# Patient Record
Sex: MALE | Race: WHITE | NOT HISPANIC OR LATINO | RURAL
[De-identification: names, ages, dates, MRNs, and addresses within clinical notes are randomized per-mention and may not be internally consistent; named-entity substitution may affect disease eponyms.]

---

## 2017-10-24 ENCOUNTER — HISTORICAL (OUTPATIENT)
Dept: ADMINISTRATIVE | Facility: HOSPITAL | Age: 77
End: 2017-10-24

## 2017-10-26 LAB
LAB AP CLINICAL INFORMATION: NORMAL
LAB AP DIAGNOSIS - HISTORICAL: NORMAL
LAB AP GROSS PATHOLOGY - HISTORICAL: NORMAL
LAB AP SPECIMEN SUBMITTED - HISTORICAL: NORMAL

## 2018-10-24 ENCOUNTER — HISTORICAL (OUTPATIENT)
Dept: ADMINISTRATIVE | Facility: HOSPITAL | Age: 78
End: 2018-10-24

## 2019-01-16 ENCOUNTER — HISTORICAL (OUTPATIENT)
Dept: ADMINISTRATIVE | Facility: HOSPITAL | Age: 79
End: 2019-01-16

## 2019-07-16 ENCOUNTER — HISTORICAL (OUTPATIENT)
Dept: ADMINISTRATIVE | Facility: HOSPITAL | Age: 79
End: 2019-07-16

## 2019-07-18 LAB
LAB AP CLINICAL INFORMATION: NORMAL
LAB AP COMMENTS: NORMAL
LAB AP DIAGNOSIS - HISTORICAL: NORMAL
LAB AP GROSS PATHOLOGY - HISTORICAL: NORMAL
LAB AP SPECIMEN SUBMITTED - HISTORICAL: NORMAL

## 2020-02-06 ENCOUNTER — HISTORICAL (OUTPATIENT)
Dept: ADMINISTRATIVE | Facility: HOSPITAL | Age: 80
End: 2020-02-06

## 2020-02-27 ENCOUNTER — HISTORICAL (OUTPATIENT)
Dept: ADMINISTRATIVE | Facility: HOSPITAL | Age: 80
End: 2020-02-27

## 2020-08-19 ENCOUNTER — HISTORICAL (OUTPATIENT)
Dept: ADMINISTRATIVE | Facility: HOSPITAL | Age: 80
End: 2020-08-19

## 2020-08-21 LAB

## 2022-08-17 PROCEDURE — 88305 TISSUE EXAM BY PATHOLOGIST: CPT | Mod: SUR

## 2022-08-17 PROCEDURE — 88305 PATHOLOGY, DERMATOLOGY: ICD-10-PCS | Mod: 26,,, | Performed by: PATHOLOGY

## 2022-08-17 PROCEDURE — 88305 TISSUE EXAM BY PATHOLOGIST: CPT | Mod: 26,,, | Performed by: PATHOLOGY

## 2022-08-18 ENCOUNTER — LAB REQUISITION (OUTPATIENT)
Dept: LAB | Facility: HOSPITAL | Age: 82
End: 2022-08-18
Payer: MEDICARE

## 2022-08-18 DIAGNOSIS — D49.2 NEOPLASM OF UNSPECIFIED BEHAVIOR OF BONE, SOFT TISSUE, AND SKIN: ICD-10-CM

## 2022-08-19 LAB
DHEA SERPL-MCNC: NORMAL
ESTROGEN SERPL-MCNC: NORMAL PG/ML
INSULIN SERPL-ACNC: NORMAL U[IU]/ML
LAB AP GROSS DESCRIPTION: NORMAL
LAB AP LABORATORY NOTES: NORMAL
LAB AP SPEC A DDX: NORMAL
LAB AP SPEC A MORPHOLOGY: NORMAL
LAB AP SPEC A PROCEDURE: NORMAL
T3RU NFR SERPL: NORMAL %

## 2022-09-08 PROCEDURE — 88305 TISSUE EXAM BY PATHOLOGIST: CPT | Mod: SUR | Performed by: DERMATOLOGY

## 2022-09-08 PROCEDURE — 88305 TISSUE EXAM BY PATHOLOGIST: CPT | Mod: 26,,, | Performed by: PATHOLOGY

## 2022-09-08 PROCEDURE — 88305 PATHOLOGY, DERMATOLOGY: ICD-10-PCS | Mod: 26,,, | Performed by: PATHOLOGY

## 2022-09-09 ENCOUNTER — LAB REQUISITION (OUTPATIENT)
Dept: LAB | Facility: HOSPITAL | Age: 82
End: 2022-09-09
Payer: MEDICARE

## 2022-09-09 DIAGNOSIS — L29.8 OTHER PRURITUS: ICD-10-CM

## 2022-09-09 DIAGNOSIS — D49.2 NEOPLASM OF UNSPECIFIED BEHAVIOR OF BONE, SOFT TISSUE, AND SKIN: ICD-10-CM

## 2022-09-09 DIAGNOSIS — L53.8 OTHER SPECIFIED ERYTHEMATOUS CONDITIONS: ICD-10-CM

## 2022-09-12 LAB
ESTROGEN SERPL-MCNC: NORMAL PG/ML
INSULIN SERPL-ACNC: NORMAL U[IU]/ML
LAB AP GROSS DESCRIPTION: NORMAL
LAB AP LABORATORY NOTES: NORMAL
LAB AP SPEC A DDX: NORMAL
LAB AP SPEC A MORPHOLOGY: NORMAL
LAB AP SPEC A PROCEDURE: NORMAL
T3RU NFR SERPL: NORMAL %

## 2025-05-15 ENCOUNTER — HOSPITAL ENCOUNTER (OUTPATIENT)
Dept: RADIOLOGY | Facility: HOSPITAL | Age: 85
Discharge: HOME | End: 2025-05-15
Attending: FAMILY MEDICINE
Payer: MEDICARE

## 2025-05-15 DIAGNOSIS — K82.9 DISEASE OF GALLBLADDER: ICD-10-CM

## 2025-05-15 PROCEDURE — 25500020 PHARM REV CODE 255

## 2025-05-15 PROCEDURE — 74178 CT ABD&PLV WO CNTR FLWD CNTR: CPT | Mod: TC

## 2025-05-15 RX ORDER — IOPAMIDOL 755 MG/ML
100 INJECTION, SOLUTION INTRAVASCULAR
Status: COMPLETED | OUTPATIENT
Start: 2025-05-15 | End: 2025-05-15

## 2025-05-15 RX ADMIN — IOPAMIDOL 85 ML: 755 INJECTION, SOLUTION INTRAVENOUS at 09:05

## 2025-05-16 DIAGNOSIS — K80.20 GALL STONE: Primary | ICD-10-CM

## 2025-05-20 ENCOUNTER — HOSPITAL ENCOUNTER (EMERGENCY)
Facility: HOSPITAL | Age: 85
Discharge: ANOTHER HOSPITAL | End: 2025-05-20
Attending: FAMILY MEDICINE
Payer: MEDICARE

## 2025-05-20 ENCOUNTER — OFFICE VISIT (OUTPATIENT)
Dept: GASTROENTEROLOGY | Facility: CLINIC | Age: 85
End: 2025-05-20
Payer: MEDICARE

## 2025-05-20 VITALS
WEIGHT: 192 LBS | DIASTOLIC BLOOD PRESSURE: 85 MMHG | TEMPERATURE: 98 F | BODY MASS INDEX: 27.49 KG/M2 | HEIGHT: 70 IN | OXYGEN SATURATION: 100 % | HEART RATE: 89 BPM | SYSTOLIC BLOOD PRESSURE: 159 MMHG | RESPIRATION RATE: 16 BRPM

## 2025-05-20 VITALS
DIASTOLIC BLOOD PRESSURE: 56 MMHG | BODY MASS INDEX: 27.49 KG/M2 | HEART RATE: 72 BPM | HEIGHT: 70 IN | OXYGEN SATURATION: 98 % | WEIGHT: 192 LBS | SYSTOLIC BLOOD PRESSURE: 125 MMHG

## 2025-05-20 DIAGNOSIS — K80.50 GALL STONES, COMMON BILE DUCT: Primary | ICD-10-CM

## 2025-05-20 DIAGNOSIS — K83.8 COMMON BILE DUCT DILATION: ICD-10-CM

## 2025-05-20 DIAGNOSIS — K80.00 GALLSTONES AND INFLAMMATION OF GALLBLADDER WITHOUT OBSTRUCTION: Primary | ICD-10-CM

## 2025-05-20 DIAGNOSIS — R93.5 ABNORMAL CT OF THE ABDOMEN: ICD-10-CM

## 2025-05-20 DIAGNOSIS — K80.20 CALCULUS OF GALLBLADDER WITHOUT CHOLECYSTITIS WITHOUT OBSTRUCTION: ICD-10-CM

## 2025-05-20 LAB
ALBUMIN SERPL BCP-MCNC: 2.7 G/DL (ref 3.4–4.8)
ALBUMIN/GLOB SERPL: 0.7 {RATIO}
ALP SERPL-CCNC: 392 U/L (ref 40–150)
ALT SERPL W P-5'-P-CCNC: 80 U/L
ANION GAP SERPL CALCULATED.3IONS-SCNC: 15 MMOL/L (ref 7–16)
APTT PPP: 35.2 SECONDS (ref 25.2–37.3)
AST SERPL W P-5'-P-CCNC: 123 U/L (ref 11–45)
BASOPHILS # BLD AUTO: 0.03 K/UL (ref 0–0.2)
BASOPHILS NFR BLD AUTO: 0.4 % (ref 0–1)
BILIRUB SERPL-MCNC: 1.9 MG/DL
BUN SERPL-MCNC: 11 MG/DL (ref 8–26)
BUN/CREAT SERPL: 15 (ref 6–20)
CALCIUM SERPL-MCNC: 9.2 MG/DL (ref 8.8–10)
CHLORIDE SERPL-SCNC: 93 MMOL/L (ref 98–107)
CO2 SERPL-SCNC: 29 MMOL/L (ref 23–31)
CREAT SERPL-MCNC: 0.75 MG/DL (ref 0.72–1.25)
DIFFERENTIAL METHOD BLD: ABNORMAL
EGFR (NO RACE VARIABLE) (RUSH/TITUS): 89 ML/MIN/1.73M2
EOSINOPHIL # BLD AUTO: 0.13 K/UL (ref 0–0.5)
EOSINOPHIL NFR BLD AUTO: 1.8 % (ref 1–4)
ERYTHROCYTE [DISTWIDTH] IN BLOOD BY AUTOMATED COUNT: 12.3 % (ref 11.5–14.5)
GLOBULIN SER-MCNC: 3.8 G/DL (ref 2–4)
GLUCOSE SERPL-MCNC: 98 MG/DL (ref 82–115)
HCT VFR BLD AUTO: 37.4 % (ref 40–54)
HGB BLD-MCNC: 12.6 G/DL (ref 13.5–18)
IMM GRANULOCYTES # BLD AUTO: 0.04 K/UL (ref 0–0.04)
IMM GRANULOCYTES NFR BLD: 0.5 % (ref 0–0.4)
INR BLD: 1.43
LYMPHOCYTES # BLD AUTO: 1.55 K/UL (ref 1–4.8)
LYMPHOCYTES NFR BLD AUTO: 21.2 % (ref 27–41)
MCH RBC QN AUTO: 34.9 PG (ref 27–31)
MCHC RBC AUTO-ENTMCNC: 33.7 G/DL (ref 32–36)
MCV RBC AUTO: 103.6 FL (ref 80–96)
MONOCYTES # BLD AUTO: 0.81 K/UL (ref 0–0.8)
MONOCYTES NFR BLD AUTO: 11.1 % (ref 2–6)
MPC BLD CALC-MCNC: 11.3 FL (ref 9.4–12.4)
NEUTROPHILS # BLD AUTO: 4.74 K/UL (ref 1.8–7.7)
NEUTROPHILS NFR BLD AUTO: 65 % (ref 53–65)
NRBC # BLD AUTO: 0 X10E3/UL
NRBC, AUTO (.00): 0 %
PLATELET # BLD AUTO: 132 K/UL (ref 150–400)
POTASSIUM SERPL-SCNC: 3.3 MMOL/L (ref 3.5–5.1)
PROT SERPL-MCNC: 6.5 G/DL (ref 5.8–7.6)
PROTHROMBIN TIME: 17.3 SECONDS (ref 11.7–14.7)
RBC # BLD AUTO: 3.61 M/UL (ref 4.6–6.2)
SODIUM SERPL-SCNC: 134 MMOL/L (ref 136–145)
WBC # BLD AUTO: 7.3 K/UL (ref 4.5–11)

## 2025-05-20 PROCEDURE — 96360 HYDRATION IV INFUSION INIT: CPT

## 2025-05-20 PROCEDURE — 99999 PR PBB SHADOW E&M-EST. PATIENT-LVL IV: CPT | Mod: PBBFAC,,,

## 2025-05-20 PROCEDURE — 36415 COLL VENOUS BLD VENIPUNCTURE: CPT | Performed by: FAMILY MEDICINE

## 2025-05-20 PROCEDURE — 99214 OFFICE O/P EST MOD 30 MIN: CPT | Mod: PBBFAC

## 2025-05-20 PROCEDURE — 85730 THROMBOPLASTIN TIME PARTIAL: CPT | Performed by: FAMILY MEDICINE

## 2025-05-20 PROCEDURE — 99285 EMERGENCY DEPT VISIT HI MDM: CPT | Mod: 25

## 2025-05-20 PROCEDURE — 80053 COMPREHEN METABOLIC PANEL: CPT | Performed by: FAMILY MEDICINE

## 2025-05-20 PROCEDURE — 25000003 PHARM REV CODE 250: Performed by: FAMILY MEDICINE

## 2025-05-20 PROCEDURE — 85025 COMPLETE CBC W/AUTO DIFF WBC: CPT | Performed by: FAMILY MEDICINE

## 2025-05-20 PROCEDURE — 99204 OFFICE O/P NEW MOD 45 MIN: CPT | Mod: S$PBB,,,

## 2025-05-20 RX ORDER — HYDROCHLOROTHIAZIDE 12.5 MG/1
12.5 TABLET ORAL DAILY
COMMUNITY

## 2025-05-20 RX ORDER — CARVEDILOL 6.25 MG/1
6.25 TABLET ORAL 2 TIMES DAILY WITH MEALS
COMMUNITY

## 2025-05-20 RX ORDER — FUROSEMIDE 20 MG/1
20 TABLET ORAL DAILY
COMMUNITY

## 2025-05-20 RX ORDER — SENNOSIDES 8.6 MG/1
1 TABLET ORAL NIGHTLY
COMMUNITY

## 2025-05-20 RX ORDER — TAMSULOSIN HYDROCHLORIDE 0.4 MG/1
0.4 CAPSULE ORAL DAILY
COMMUNITY

## 2025-05-20 RX ORDER — LANOLIN ALCOHOL/MO/W.PET/CERES
1 CREAM (GRAM) TOPICAL DAILY
COMMUNITY

## 2025-05-20 RX ADMIN — SODIUM CHLORIDE 1000 ML: 9 INJECTION, SOLUTION INTRAVENOUS at 04:05

## 2025-05-20 NOTE — ED PROVIDER NOTES
Encounter Date: 5/20/2025    SCRIBE #1 NOTE: I, Maria Elena Enriquez, am scribing for, and in the presence of,  Jose F oJhn DO. I have scribed the entire note.       History     Chief Complaint   Patient presents with    Abdominal Pain     Patient presents to the ED with c/o stone stuck in a bile duct going into the small intestine. Patient states Melisa Hoskins sent him here for further evaluation and lab work to be done.     This is an 83 y/o male,who presents to the ED for further evaluation. He states he was at Melisa Hoskins's office and sent here for further evaluation. He notes he was found to have a stone stuck in his bile duct going into the small intestines. There is no hx of a fever, chills, nausea, vomiting or diarrhea voiced today. There are no other complaints/pain the ED at this time. 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 There is no other past medical hx on file. There is no surgical hx on file. There is no smoking hx on file.     The history is provided by the patient and the spouse.     Review of patient's allergies indicates:   Allergen Reactions    Adhesive      Tears skin     History reviewed. No pertinent past medical history.  History reviewed. No pertinent surgical history.  No family history on file.  Social History[1]  Review of Systems   Constitutional:  Positive for fatigue. Negative for fever.   HENT: Negative.  Negative for sore throat.    Eyes: Negative.    Respiratory: Negative.  Negative for shortness of breath.    Cardiovascular: Negative.  Negative for chest pain.   Gastrointestinal: Negative.  Negative for nausea.   Endocrine: Negative.    Genitourinary: Negative.  Negative for dysuria.   Musculoskeletal: Negative.  Negative for back pain.   Skin: Negative.  Negative for rash.   Allergic/Immunologic: Negative.    Neurological: Negative.  Negative for weakness.   Hematological: Negative.  Does not bruise/bleed easily.   Psychiatric/Behavioral: Negative.          Physical Exam     Initial Vitals [05/20/25 1023]   BP Pulse Resp Temp SpO2   129/68 74 17 97.7 °F (36.5 °C) 97 %      MAP       --         Physical Exam    ED Course   Procedures  Labs Reviewed   COMPREHENSIVE METABOLIC PANEL - Abnormal       Result Value    Sodium 134 (*)     Potassium 3.3 (*)     Chloride 93 (*)     CO2 29      Anion Gap 15      Glucose 98      BUN 11      Creatinine 0.75      BUN/Creatinine Ratio 15      Calcium 9.2      Total Protein 6.5      Albumin 2.7 (*)     Globulin 3.8      A/G Ratio 0.7      Bilirubin, Total 1.9 (*)     Alk Phos 392 (*)     ALT 80 (*)      (*)     eGFR 89     PT AND PTT - Abnormal    PT 17.3 (*)     INR 1.43      PTT 35.2     CBC WITH DIFFERENTIAL - Abnormal    WBC 7.30      RBC 3.61 (*)     Hemoglobin 12.6 (*)     Hematocrit 37.4 (*)     .6 (*)     MCH 34.9 (*)     MCHC 33.7      RDW 12.3      Platelet Count 132 (*)     MPV 11.3      Neutrophils % 65.0      Lymphocytes % 21.2 (*)     Monocytes % 11.1 (*)     Eosinophils % 1.8      Basophils % 0.4      Immature Granulocytes % 0.5 (*)     nRBC, Auto 0.0      Neutrophils, Abs 4.74      Lymphocytes, Absolute 1.55      Monocytes, Absolute 0.81 (*)     Eosinophils, Absolute 0.13      Basophils, Absolute 0.03      Immature Granulocytes, Absolute 0.04      nRBC, Absolute 0.00      Diff Type Auto     CBC W/ AUTO DIFFERENTIAL    Narrative:     The following orders were created for panel order CBC auto differential.  Procedure                               Abnormality         Status                     ---------                               -----------         ------                     CBC with Differential[[id number]]       Abnormal            Final result                 Please view results for these tests on the individual orders.          Imaging Results    None          Medications   sodium chloride 0.9% bolus 1,000 mL 1,000 mL (0 mLs Intravenous Stopped 5/20/25 3880)     Medical Decision Making  This is  an 85 y/o male,who presents to the ED for further evaluation. He states he was at [facility]'s office and sent here for further evaluation. He notes he was found to have a stone stuck in his bile duct going into the small intestines. There is no hx of a fever, chills, nausea, vomiting or diarrhea voiced today. There are no other complaints/pain the ED at this time. 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 There is no other past medical hx on file. There is no surgical hx on file. There is no smoking hx on file.     Amount and/or Complexity of Data Reviewed  Independent Historian: spouse     Details: We spoke with the pt and his wife.     Labs: ordered.              Attending Attestation:           Physician Attestation for Scribe:  Physician Attestation Statement for Scribe #1: I, Jose F John, DO, reviewed documentation, as scribed by Maria Elena Enriquez in my presence, and it is both accurate and complete.                        Medical Decision Making:   Initial Assessment:   This is an 85 y/o male,who presents to the ED for further evaluation. He states he was at [facility]'s office and sent here for further evaluation. He notes he was found to have a stone stuck in his bile duct going into the small intestines. There is no hx of a fever, chills, nausea, vomiting or diarrhea voiced today. There are no other complaints/pain the ED at this time. 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 There is no other past medical hx on file. There is no surgical hx on file. There is no smoking hx on file.     The history is provided by the patient and the spouse.     Differential Diagnosis:   Gallstone common bile duct  ED Management:  Elevated liver enzymes and bilirubin will look at transferring the patient to [facility] for ERCP--Ezekiel's unable to take it the patient will be going to [place] in [facility] to the care of Dr. Bren Smalls             Clinical  Impression:  Final diagnoses:  [K80.00] Gallstones and inflammation of gallbladder without obstruction (Primary)          ED Disposition Condition    Transfer to Another Facility Stable                      [1]   Social History  Tobacco Use    Smoking status: Never    Smokeless tobacco: Never   Substance Use Topics    Alcohol use: Never    Drug use: Never        Jose F John,   05/28/25 1838

## 2025-05-20 NOTE — ED TRIAGE NOTES
Presents to ED from [facility] in GI for evaluation and treatment of possible stone in common bile duct.  Patient was seeing Mel[facility]  Patient complains of left upper and left lower quadrant pain.  Has a history of Guillain-Hamilton Syndrome.  Patient wears 3 liters of oxygen at home.

## 2025-05-20 NOTE — PROGRESS NOTES
CC:  Common bile duct stone      HPI 84 y.o. white male presents today for complaint of mid belly pain and left-sided abdominal pain.  Patient had a recent CT of the abdomen showing 10 mm common duct stone with common duct and proximal intrahepatic biliary dilation.  There is some stranding about the duodenum and pancreatic head region.  There is a small amount of air in the common duct and pancreatic head region.  Underlying infectious process with microperforation not excluded.  I have discussed this patient with Dr. Sharma and patient is in need of ERCP or possible surgical exploration.  Dr. Sharma called and spoke with charge nurse in emergency room to let them know that patient will be coming down and his diagnosis.  Patient and wife are in agreement.  Patient states he is hurting in has been running fever on and off the past 2 weeks.  Highest fever reached 102.  Patient denies any jaundice of his sclerae or skin.  Denies any discoloration of his urine or stool.  There are no labs in the chart.  I have ordered a CBC CMP amylase lipase.  Patient transferred to emergency room with nel Amaro and Marta salamanca LPN accompanying patient in wife.    Medical records reviewed. Additional history supplemented by nursing.     No past medical history on file.      No past surgical history on file.    Social History  Social History[1]      No family history on file.    Review of Systems   Gastrointestinal:  Positive for abdominal pain. Negative for blood in stool, constipation, diarrhea, heartburn, melena, nausea and vomiting.   All other systems reviewed and are negative.       Physical Exam  Vitals reviewed.   Constitutional:       Appearance: Normal appearance.   Abdominal:      General: Bowel sounds are normal. There is no distension.      Palpations: Abdomen is soft.      Tenderness: There is abdominal tenderness in the left upper quadrant.       Skin:     General: Skin is warm and dry.      Coloration:  "Skin is not jaundiced.   Neurological:      Mental Status: He is alert and oriented to person, place, and time.   Psychiatric:         Mood and Affect: Mood normal.         Behavior: Behavior normal.          Labs:  No results found for: "WBC", "HGB", "HCT", "MCV", "PLT"    CMP  No results found for: "NA", "K", "CL", "CO2", "GLU", "BUN", "CREATININE", "CALCIUM", "PROT", "ALBUMIN", "BILITOT", "ALKPHOS", "AST", "ALT", "ANIONGAP", "ESTGFRAFRICA", "EGFRNONAA"    Imaging:  CT ABDOMEN PELVIS W WO CONTRAST     CLINICAL HISTORY:  Disease of gallbladder, unspecified     TECHNIQUE:  Low dose axial images, sagittal and coronal reformations were obtained from the lung bases to the pubic symphysis following the IV administration of 100 mL of Omnipaque 350 .     COMPARISON:  None.     FINDINGS:  Abdomen:     - Lower thorax:Heart is not enlarged.  Aortic and coronary calcifications.     - Lung bases: Bibasal pleuroparenchymal scarring and atelectasis.     - Liver: No focal mass.     - Gallbladder: Distended.  No obvious stone.     - Bile Ducts: Dilated common duct with likely stone in the pancreatic head measuring up to 10 mm series 301, image 48.     - Spleen: No focal mass.     - Kidneys: Normal size.  No renal mass or hydronephrosis.     - Adrenals: Unremarkable.     - Pancreas: No pancreatic mass.  No pancreatic ductal dilatation.     - Retroperitoneum:  No significant adenopathy.     - Vascular: Diffuse atherosclerosis aorta and iliac arteries.     - Abdominal wall:  Unremarkable.     Pelvis:     No pelvic mass, adenopathy, or free fluid.     Bowel/Mesentery:     No evidence of bowel obstruction.  There is some thickening of the duodenum and stranding about the C loop and pancreatic head.     Bones:  Degenerative change seen along the spine with disc space narrowing all lumbar levels with marginal osteophytes.  Arthritic change right hip.  Left hip prosthesis in place.     Impression:     There is a 10 mm common duct stone " with common duct and proximal intrahepatic biliary dilatation.  There is some minimal stranding about the duodenum and pancreatic head region.  There is a small amount of air about the common duct and pancreatic head region.  Underlying infectious process with micro perforation not excluded.  Distended gallbladder.  Would recommend GI evaluation.        Electronically signed by:Riley Augustin  Date:                                            05/15/2025    Independently reviewed    Assessment:  Jonathan David 84-year-old white male here with  Common bile duct stone with ductal dilation  Fevers  Abdominal pain left upper quadrant    Plan:   Labs CBC CMP lipase and amylase for common bile duct in possible underlying infectious process in pancreas  Patient's sit to the emergency room, charge nurse in emergency room has been informed of patient diagnosis.  Follow-up in 4 weeks or sooner    I spent a total of 45 minutes on the day of the visit.This includes face to face time and non-face to face time preparing to see the patient (eg, review of tests), obtaining and/or reviewing separately obtained history, documenting clinical information in the electronic or other health record, independently interpreting results and communicating results to the patient/family/caregiver, or care coordinator.   Carla Adams, FNP [facility]           [1]   Social History  Tobacco Use    Smoking status: Never    Smokeless tobacco: Never

## 2025-06-20 DIAGNOSIS — R10.11 RIGHT UPPER QUADRANT ABDOMINAL PAIN: Primary | ICD-10-CM

## 2025-06-24 ENCOUNTER — RESULTS FOLLOW-UP (OUTPATIENT)
Dept: GASTROENTEROLOGY | Facility: CLINIC | Age: 85
End: 2025-06-24
Payer: MEDICARE

## 2025-06-24 ENCOUNTER — OFFICE VISIT (OUTPATIENT)
Dept: GASTROENTEROLOGY | Facility: CLINIC | Age: 85
End: 2025-06-24
Payer: MEDICARE

## 2025-06-24 VITALS
BODY MASS INDEX: 27.2 KG/M2 | WEIGHT: 190 LBS | SYSTOLIC BLOOD PRESSURE: 122 MMHG | HEIGHT: 70 IN | HEART RATE: 78 BPM | DIASTOLIC BLOOD PRESSURE: 62 MMHG | OXYGEN SATURATION: 97 %

## 2025-06-24 DIAGNOSIS — K74.60 HEPATIC CIRRHOSIS, UNSPECIFIED HEPATIC CIRRHOSIS TYPE, UNSPECIFIED WHETHER ASCITES PRESENT: ICD-10-CM

## 2025-06-24 DIAGNOSIS — Z11.59 ENCOUNTER FOR SCREENING FOR OTHER VIRAL DISEASES: ICD-10-CM

## 2025-06-24 DIAGNOSIS — R10.11 RIGHT UPPER QUADRANT ABDOMINAL PAIN: Primary | ICD-10-CM

## 2025-06-24 DIAGNOSIS — K72.10 CHRONIC LIVER FAILURE WITHOUT HEPATIC COMA: ICD-10-CM

## 2025-06-24 DIAGNOSIS — R93.2 ABNORMAL CT OF LIVER: ICD-10-CM

## 2025-06-24 PROCEDURE — 99999 PR PBB SHADOW E&M-EST. PATIENT-LVL IV: CPT | Mod: PBBFAC,,,

## 2025-06-24 PROCEDURE — 99214 OFFICE O/P EST MOD 30 MIN: CPT | Mod: PBBFAC

## 2025-06-24 PROCEDURE — 99214 OFFICE O/P EST MOD 30 MIN: CPT | Mod: S$PBB,,,

## 2025-06-24 NOTE — PROGRESS NOTES
Some of the lab has returned  Your sodium is a little low 132 if he begins to have any kind of confusion decrease in your reflexes more so than normal please go to the emergency room  Liver enzymes are normal range previously they were very elevated and they are back to normal range  Hemoglobin and hematocrit are a little low not terribly low a little anemia platelet count is 109 which is low  All these factors have to do with the cirrhosis the liver disease  Amylase and lipase we look a nap pancreatic function and those are normal  Your iron levels are normal  There several liver labs that have come back and they are normal or negative thus far.  There are several more liver labs they have not resulted once those are complete we will let you know those  As soon as I get the results of the MRI we will let you know those as well.  Be thinking of a liver specialist hepatologist were you Wanna go [place] or [facility] or [facility]

## 2025-06-24 NOTE — PROGRESS NOTES
CC:  Follow-up post cholecystectomy    HPI 84 y.o. white male presents today for follow-up post cholecystectomy.  Patient did have a 10 mm common duct stone in ended up having an ERCP to remove the stone at [place] in [facility].  Patient states he has been doing well other than he has been having some enlargement on his right upper quadrant.  Patient had a CT of his abdomen and pelvis that shows nodular liver contour consistent with cirrhosis recanalized umbilical vein consistent with portal hypertension and mild steatosis.  There is a hypervascular lesion in segment 4A of the liver.  Given the appearance of cirrhosis this is most concerning for hepatocellular carcinoma.  Correlation with resected gallbladder pathology is necessary however as differential might include metastasis correlation with AFP levels also recommended consider MRI PET/CT Tian hypotension with splenomegaly in his small volume of ascites.  Patient denies any nausea vomiting or dysphagia.  No hematochezia or melena.  Labs today hemoglobin 11.2 hematocrit 33.6 platelet count 109 sodium 132 ALT 24 AST 40 total bilirubin 0.8  Meld is 10     Interval  HPI 84 y.o. white male presents today for complaint of mid belly pain and left-sided abdominal pain.  Patient had a recent CT of the abdomen showing 10 mm common duct stone with common duct and proximal intrahepatic biliary dilation.  There is some stranding about the duodenum and pancreatic head region.  There is a small amount of air in the common duct and pancreatic head region.  Underlying infectious process with microperforation not excluded.  I have discussed this patient with Dr. Sharma and patient is in need of ERCP or possible surgical exploration.  Dr. Sharma called and spoke with charge nurse in emergency room to let them know that patient will be coming down and his diagnosis.  Patient and wife are in agreement.  Patient states he is hurting in has been running fever  on and off the past 2 weeks.  Highest fever reached 102.  Patient denies any jaundice of his sclerae or skin.  Denies any discoloration of his urine or stool.  There are no labs in the chart.  I have ordered a CBC CMP amylase lipase.  Patient transferred to emergency room with nel Amaro and Marta salamanca LPN accompanying patient in wife.   Medical records reviewed. Additional history supplemented by nursing.     No past medical history on file.    Review of Systems   Gastrointestinal:  Positive for abdominal pain. Negative for blood in stool, constipation, diarrhea, heartburn, melena, nausea and vomiting.        Right upper quadrant abdominal pain   All other systems reviewed and are negative.       Physical Exam  Vitals reviewed.   Constitutional:       Appearance: Normal appearance.   Abdominal:      General: Bowel sounds are normal. There is no distension.      Palpations: Abdomen is soft.      Tenderness: There is abdominal tenderness in the right upper quadrant.          Comments: Right upper quadrant abdominal pain with swelling   Skin:     General: Skin is warm and dry.      Coloration: Skin is not jaundiced.   Neurological:      Mental Status: He is alert and oriented to person, place, and time.   Psychiatric:         Mood and Affect: Mood normal.         Behavior: Behavior normal.          Anesthesia/Surgery risks, benefits and alternative options discussed and understood by patient/family.   Labs:  Lab Results   Component Value Date    WBC 7.30 05/20/2025    HGB 12.6 (L) 05/20/2025    HCT 37.4 (L) 05/20/2025    .6 (H) 05/20/2025     (L) 05/20/2025       CMP  Sodium   Date Value Ref Range Status   05/20/2025 134 (L) 136 - 145 mmol/L Final     Potassium   Date Value Ref Range Status   05/20/2025 3.3 (L) 3.5 - 5.1 mmol/L Final     Chloride   Date Value Ref Range Status   05/20/2025 93 (L) 98 - 107 mmol/L Final     CO2   Date Value Ref Range Status   05/20/2025 29 23 - 31 mmol/L Final      Glucose   Date Value Ref Range Status   2025 98 82 - 115 mg/dL Final     BUN   Date Value Ref Range Status   2025 11 8 - 26 mg/dL Final     Creatinine   Date Value Ref Range Status   2025 0.75 0.72 - 1.25 mg/dL Final     Calcium   Date Value Ref Range Status   2025 9.2 8.8 - 10.0 mg/dL Final     Total Protein   Date Value Ref Range Status   2025 6.5 5.8 - 7.6 g/dL Final     Albumin   Date Value Ref Range Status   2025 2.7 (L) 3.4 - 4.8 g/dL Final     Bilirubin, Total   Date Value Ref Range Status   2025 1.9 (H) <=1.5 mg/dL Final     Alk Phos   Date Value Ref Range Status   2025 392 (H) 40 - 150 U/L Final     AST   Date Value Ref Range Status   2025 123 (H) 11 - 45 U/L Final     ALT   Date Value Ref Range Status   2025 80 (H) <=55 U/L Final     Anion Gap   Date Value Ref Range Status   2025 15 7 - 16 mmol/L Final       Imagin25  CT of his abdomen and pelvis that shows nodular liver contour consistent with cirrhosis recanalized umbilical vein consistent with portal hypertension and mild steatosis.  There is a hypervascular lesion in segment 4A of the liver.  Given the appearance of cirrhosis this is most concerning for hepatocellular carcinoma.  Correlation with resected gallbladder pathology is necessary however as differential might include metastasis correlation with AFP levels also recommended consider MRI PET/CT Tian hypotension with splenomegaly in his small volume of ascites    Independently reviewed    Assessment:  Larry Sterling 84-year-old male here with:  Status post cholecystectomy  Cirrhosis  Liver lesion    Plan:  MRI MRCP for abnormal CT rule out hepatocellular carcinoma history of cirrhosis  Liver lab workup  Once MRI MRCP has resulted we will plan for hepatology referral depending on findings  Follow-up in 3 months or sooner    I spent a total of 30 minutes on the day of the visit.This includes face to face time and non-face  to face time preparing to see the patient (eg, review of tests), obtaining and/or reviewing separately obtained history, documenting clinical information in the electronic or other health record, independently interpreting results and communicating results to the patient/family/caregiver, or care coordinator.     Carla Adams, FNP [facility]

## 2025-06-24 NOTE — PROGRESS NOTES
Hepatitis panel is back you do not have hepatitis AB or C which is great and can cause cirrhosis  The AFP that looks at certain types of cancer light liver cancer is normal range so that is a very good sign as well

## 2025-06-25 NOTE — PROGRESS NOTES
Your protein electrophoresis is good your albumin is a smidge low at 3.4 your normal should be 3.5.  To make sure your adding protein to your diet  Your ferritin level is normal range

## 2025-06-26 NOTE — PROGRESS NOTES
So far labs that have come back AMA is normal range what looks at severe liver disease  Anti smooth muscle antibody is negative which looks at severe liver disease  KEVIN that looks at autoimmune disease is negative as well these are all good side

## 2025-06-30 ENCOUNTER — TELEPHONE (OUTPATIENT)
Dept: GASTROENTEROLOGY | Facility: CLINIC | Age: 85
End: 2025-06-30
Payer: MEDICARE

## 2025-06-30 NOTE — TELEPHONE ENCOUNTER
Call to pt - pt asked that results be reviewed w/ spouse - results reviewed w/ good vu noted - review of MRI scheduled for 7/10/25 at 0915 - aware we will reach out once results received - spouse stated if hepatology is preferred she request [place]

## 2025-06-30 NOTE — TELEPHONE ENCOUNTER
----- Message from Melisa Hoskins NP sent at 6/24/2025  4:52 PM CDT -----  Some of the lab has returned  Your sodium is a little low 132 if he begins to have any kind of confusion decrease in your reflexes more so than normal please go to the emergency room  Liver enzymes are normal range previously they were very elevated and they are back to normal range  Hemoglobin and hematocrit are a little low not terribly low a little anemia platelet count is 109 which is low  All these factors have to do with the cirrhosis the liver disease  Amylase and lipase we look a nap pancreatic function and those are normal  Your iron levels are normal  There several liver labs that have come back and they are normal or negative thus far.  There are several more liver labs they have not resulted once those are complete we will let you know those  As soon as I get the results of the MRI we will let you know those as well.  Be thinking of a liver specialist hepatologist were you Wanna go [place] or [facility] or [place]  ----- Message -----  From: Lab, Background User  Sent: 6/24/2025   3:42 PM CDT  To: Melisa Hoskins NP

## 2025-07-03 ENCOUNTER — HOSPITAL ENCOUNTER (EMERGENCY)
Facility: HOSPITAL | Age: 85
Discharge: HOME | End: 2025-07-04
Attending: INTERNAL MEDICINE
Payer: MEDICARE

## 2025-07-03 DIAGNOSIS — R06.02 SHORTNESS OF BREATH: ICD-10-CM

## 2025-07-03 DIAGNOSIS — I50.42 CHRONIC COMBINED SYSTOLIC AND DIASTOLIC CONGESTIVE HEART FAILURE: ICD-10-CM

## 2025-07-03 DIAGNOSIS — J44.1 COPD EXACERBATION: Primary | ICD-10-CM

## 2025-07-03 LAB
ALBUMIN SERPL BCP-MCNC: 2.7 G/DL (ref 3.4–4.8)
ALBUMIN/GLOB SERPL: 0.8 {RATIO}
ALP SERPL-CCNC: 124 U/L (ref 40–150)
ALT SERPL W P-5'-P-CCNC: 19 U/L
ANION GAP SERPL CALCULATED.3IONS-SCNC: 11 MMOL/L (ref 7–16)
AST SERPL W P-5'-P-CCNC: 43 U/L (ref 11–45)
BASOPHILS # BLD AUTO: 0.02 K/UL (ref 0–0.2)
BASOPHILS NFR BLD AUTO: 0.3 % (ref 0–1)
BILIRUB SERPL-MCNC: 0.7 MG/DL
BUN SERPL-MCNC: 12 MG/DL (ref 8–26)
BUN/CREAT SERPL: 16 (ref 6–20)
CALCIUM SERPL-MCNC: 8.4 MG/DL (ref 8.8–10)
CHLORIDE SERPL-SCNC: 88 MMOL/L (ref 98–107)
CO2 SERPL-SCNC: 35 MMOL/L (ref 23–31)
CREAT SERPL-MCNC: 0.73 MG/DL (ref 0.72–1.25)
D DIMER PPP FEU-MCNC: 3.22 ΜG/ML (ref 0–0.47)
DIFFERENTIAL METHOD BLD: ABNORMAL
EGFR (NO RACE VARIABLE) (RUSH/TITUS): 90 ML/MIN/1.73M2
EOSINOPHIL # BLD AUTO: 0.26 K/UL (ref 0–0.5)
EOSINOPHIL NFR BLD AUTO: 4.3 % (ref 1–4)
ERYTHROCYTE [DISTWIDTH] IN BLOOD BY AUTOMATED COUNT: 13.4 % (ref 11.5–14.5)
GLOBULIN SER-MCNC: 3.5 G/DL (ref 2–4)
GLUCOSE SERPL-MCNC: 100 MG/DL (ref 82–115)
HCT VFR BLD AUTO: 30.1 % (ref 40–54)
HGB BLD-MCNC: 10.5 G/DL (ref 13.5–18)
IMM GRANULOCYTES # BLD AUTO: 0.03 K/UL (ref 0–0.04)
IMM GRANULOCYTES NFR BLD: 0.5 % (ref 0–0.4)
LYMPHOCYTES # BLD AUTO: 1.27 K/UL (ref 1–4.8)
LYMPHOCYTES NFR BLD AUTO: 21 % (ref 27–41)
MCH RBC QN AUTO: 34.2 PG (ref 27–31)
MCHC RBC AUTO-ENTMCNC: 34.9 G/DL (ref 32–36)
MCV RBC AUTO: 98 FL (ref 80–96)
MONOCYTES # BLD AUTO: 1.02 K/UL (ref 0–0.8)
MONOCYTES NFR BLD AUTO: 16.9 % (ref 2–6)
MPC BLD CALC-MCNC: 9.6 FL (ref 9.4–12.4)
NEUTROPHILS # BLD AUTO: 3.45 K/UL (ref 1.8–7.7)
NEUTROPHILS NFR BLD AUTO: 57 % (ref 53–65)
NRBC # BLD AUTO: 0 X10E3/UL
NRBC, AUTO (.00): 0 %
NT-PROBNP SERPL-MCNC: 850 PG/ML (ref 1–450)
PLATELET # BLD AUTO: 117 K/UL (ref 150–400)
POTASSIUM SERPL-SCNC: 3.9 MMOL/L (ref 3.5–5.1)
PROT SERPL-MCNC: 6.2 G/DL (ref 5.8–7.6)
RBC # BLD AUTO: 3.07 M/UL (ref 4.6–6.2)
SODIUM SERPL-SCNC: 130 MMOL/L (ref 136–145)
TROPONIN I SERPL HS-MCNC: 7.8 NG/L
WBC # BLD AUTO: 6.05 K/UL (ref 4.5–11)

## 2025-07-03 PROCEDURE — 63600175 PHARM REV CODE 636 W HCPCS: Performed by: INTERNAL MEDICINE

## 2025-07-03 PROCEDURE — 99285 EMERGENCY DEPT VISIT HI MDM: CPT | Mod: 25

## 2025-07-03 PROCEDURE — 93010 ELECTROCARDIOGRAM REPORT: CPT | Mod: ,,, | Performed by: INTERNAL MEDICINE

## 2025-07-03 PROCEDURE — 99284 EMERGENCY DEPT VISIT MOD MDM: CPT | Performed by: INTERNAL MEDICINE

## 2025-07-03 PROCEDURE — 85025 COMPLETE CBC W/AUTO DIFF WBC: CPT | Performed by: INTERNAL MEDICINE

## 2025-07-03 PROCEDURE — 96374 THER/PROPH/DIAG INJ IV PUSH: CPT

## 2025-07-03 PROCEDURE — 93005 ELECTROCARDIOGRAM TRACING: CPT

## 2025-07-03 PROCEDURE — 83880 ASSAY OF NATRIURETIC PEPTIDE: CPT | Performed by: INTERNAL MEDICINE

## 2025-07-03 PROCEDURE — 84484 ASSAY OF TROPONIN QUANT: CPT | Performed by: INTERNAL MEDICINE

## 2025-07-03 PROCEDURE — 80053 COMPREHEN METABOLIC PANEL: CPT | Performed by: INTERNAL MEDICINE

## 2025-07-03 PROCEDURE — 85379 FIBRIN DEGRADATION QUANT: CPT | Performed by: INTERNAL MEDICINE

## 2025-07-03 RX ORDER — FUROSEMIDE 10 MG/ML
40 INJECTION INTRAMUSCULAR; INTRAVENOUS
Status: COMPLETED | OUTPATIENT
Start: 2025-07-03 | End: 2025-07-03

## 2025-07-03 RX ADMIN — FUROSEMIDE 40 MG: 10 INJECTION, SOLUTION INTRAMUSCULAR; INTRAVENOUS at 10:07

## 2025-07-04 VITALS
TEMPERATURE: 98 F | BODY MASS INDEX: 28.63 KG/M2 | SYSTOLIC BLOOD PRESSURE: 129 MMHG | HEIGHT: 70 IN | HEART RATE: 85 BPM | OXYGEN SATURATION: 98 % | WEIGHT: 200 LBS | RESPIRATION RATE: 21 BRPM | DIASTOLIC BLOOD PRESSURE: 70 MMHG

## 2025-07-04 PROCEDURE — 25500020 PHARM REV CODE 255: Performed by: INTERNAL MEDICINE

## 2025-07-04 RX ORDER — IOPAMIDOL 755 MG/ML
100 INJECTION, SOLUTION INTRAVASCULAR
Status: COMPLETED | OUTPATIENT
Start: 2025-07-04 | End: 2025-07-04

## 2025-07-04 RX ADMIN — IOPAMIDOL 100 ML: 755 INJECTION, SOLUTION INTRAVENOUS at 12:07

## 2025-07-04 NOTE — ED PROVIDER NOTES
Encounter Date: 7/3/2025       History     Chief Complaint   Patient presents with    Shortness of Breath     Patient comes in with complaints of shortness of breath that started yesterday morning after ambulating back from bathroom .States that he has shortness of breath when laying back or with activity, Patient is oxygen dependent at 3 liters     Patient comes in complaining of shortness breath gum dyspnea on exertion PND and orthopnea for last 2 days.  History of chronic atrial fibrillation but states he does not have history of CHF.  Does have history of COPD is on chronic oxygen therapy at 3 L.  Has a history of chronic hypoxic respiratory failure, followed by Pulmonary in [place] and cardiology in [place].  Denies any fever chills, nausea vomiting, neurological deficits, incontinence urine or bowel.    MEDICAL HISTORY:  Mr. David has a past medical history of Arthritis, CAD (coronary artery disease) of artery bypass graft s/p 3V by Dr. Gonzalez 9/29/2011 (9/29/2011), CAD (coronary artery disease) of artery bypass graft s/p 3V by Dr. Gonzalez 9/29/2011 (9/29/2011), Carotid stenosis, bilateral stage 1 (2/14/2012), Chronic kidney disease, stage III (moderate) (*) (4/23/2014), COPD (chronic obstructive pulmonary disease) (*), COPD, moderate, with bi-apical scarring (2/27/2012), Essential hypertension, benign (2/14/2012), FUO (fever of unknown origin), 2/2012 (4/2/2012), Hyperlipidemia (2/14/2012), Hypertension, Lobar pneumonia due to unspecified organism, RLL 10/2011 (2/14/2012), Osteoarthritis of multiple joints (2/14/2012), Pneumonia (10/2011), and Pulmonary embolism [415.19AD], 3/20/2012, no DVT, (4/2/2012).        Review of patient's allergies indicates:   Allergen Reactions    Adhesive      Tears skin     Past Medical History:   Diagnosis Date    Anticoagulant long-term use     COPD (chronic obstructive pulmonary disease)     Coronary artery disease     Other pulmonary embolism without acute cor pulmonale      Paroxysmal atrial fibrillation      Past Surgical History:   Procedure Laterality Date    CARDIAC SURGERY      CHOLECYSTECTOMY      EYE SURGERY      JOINT REPLACEMENT       No family history on file.  Social History[1]  Review of Systems   Constitutional:  Negative for fever.   HENT:  Negative for sore throat.    Respiratory:  Negative for shortness of breath.    Cardiovascular:  Negative for chest pain.   Gastrointestinal:  Negative for nausea.   Genitourinary:  Negative for dysuria.   Musculoskeletal:  Negative for back pain.   Skin:  Negative for rash.   Neurological:  Negative for weakness.   Hematological:  Does not bruise/bleed easily.       Physical Exam     Initial Vitals [07/03/25 2216]   BP Pulse Resp Temp SpO2   (!) 151/84 81 20 97.8 °F (36.6 °C) 98 %      MAP       --         Physical Exam    Cardiovascular:  Regular rhythm, normal heart sounds and normal pulses.           Pulmonary/Chest: No accessory muscle usage. No respiratory distress. He has decreased breath sounds. He has no wheezes. He has rhonchi. He has rales.   Abdominal: Abdomen is protuberant. Bowel sounds are normal. There is no rebound and no guarding.     Neurological: No cranial nerve deficit. GCS eye subscore is 4. GCS verbal subscore is 5. GCS motor subscore is 6.         Medical Screening Exam   See Full Note    ED Course   Procedures  Labs Reviewed   COMPREHENSIVE METABOLIC PANEL - Abnormal       Result Value    Sodium 130 (*)     Potassium 3.9      Chloride 88 (*)     CO2 35 (*)     Anion Gap 11      Glucose 100      BUN 12      Creatinine 0.73      BUN/Creatinine Ratio 16      Calcium 8.4 (*)     Total Protein 6.2      Albumin 2.7 (*)     Globulin 3.5      A/G Ratio 0.8      Bilirubin, Total 0.7      Alk Phos 124      ALT 19      AST 43      eGFR 90     NT-PRO NATRIURETIC PEPTIDE - Abnormal    ProBNP 850 (*)    CBC WITH DIFFERENTIAL - Abnormal    WBC 6.05      RBC 3.07 (*)     Hemoglobin 10.5 (*)     Hematocrit 30.1 (*)     MCV  98.0 (*)     MCH 34.2 (*)     MCHC 34.9      RDW 13.4      Platelet Count 117 (*)     MPV 9.6      Neutrophils % 57.0      Lymphocytes % 21.0 (*)     Monocytes % 16.9 (*)     Eosinophils % 4.3 (*)     Basophils % 0.3      Immature Granulocytes % 0.5 (*)     nRBC, Auto 0.0      Neutrophils, Abs 3.45      Lymphocytes, Absolute 1.27      Monocytes, Absolute 1.02 (*)     Eosinophils, Absolute 0.26      Basophils, Absolute 0.02      Immature Granulocytes, Absolute 0.03      nRBC, Absolute 0.00      Diff Type Auto     D DIMER, QUANTITATIVE - Abnormal    D-Dimer 3.22 (*)    TROPONIN I - Normal    Troponin I High Sensitivity 7.8     CBC W/ AUTO DIFFERENTIAL    Narrative:     The following orders were created for panel order CBC Auto Differential.  Procedure                               Abnormality         Status                     ---------                               -----------         ------                     CBC with Differential[[id number]]       Abnormal            Final result                 Please view results for these tests on the individual orders.     EKG Readings: (Independently Interpreted)   Initial Reading: No STEMI. Rhythm: Normal Sinus Rhythm. Heart Rate: ED. Conduction: 1st Degree AV Block. Axis: Right Axis Deviation. Clinical Impression: Normal Sinus Rhythm and AV Block - 1st Degree with RBBB   Sinus rhythm first-degree AV block, heart rate 80, right bundle-branch block, no old EKG for comparison       Imaging Results              CTA Chest Non-Coronary (PE Studies) (Final result)  Result time 07/04/25 00:19:16      Final result by Larry Vo MD (07/04/25 00:19:16)                   Impression:      1. No evidence of acute pulmonary thromboembolism.  2. Parenchymal and pleural opacities in the lung bases similar to 06/23/2025.  Correlate for chronic lung scarring/atelectasis versus residual inflammatory infectious process with parapneumonic effusion.  3. Changes of previous median  sternotomy and CABG with aortic and mitral valve disease.      Electronically signed by: Larry Vo  Date:    07/04/2025  Time:    00:19               Narrative:    EXAMINATION:  CTA CHEST NON CORONARY (PE STUDIES)    CLINICAL HISTORY:  Pulmonary embolism (PE) suspected, high prob;    TECHNIQUE:  Following the intravenous administration of 75 cc of Omnipaque 350 contrast material, 1.25 mm contiguous axial images were acquired through the chest utilizing the CT pulmonary angiogram protocol.  2-D coronal and sagittal reconstructed images of the chest and sagittal oblique MIP images of the pulmonary arterial system were generated from the source data.    COMPARISON:  CT abdomen, 06/23/2025    FINDINGS:  Pulmonary arterial system: This is a good quality examination for the evaluation of pulmonary thromboembolism with good opacification of the pulmonary arteries to the level of the segmental branches of the pulmonary arterial system. There is no evidence of acute pulmonary thromboembolism. No large saddle pulmonary embolism, enlargement of the main pulmonary artery, or secondary findings of right ventricular strain.    Aorta and heart: The heart is normal in size.  No pericardial fluid.  No thoracic aortic aneurysm.  No thoracic aortic dissection.  Multi-vessel coronary artery calcifications with aortic and mitral valve calcifications.  Previous CABG    Airways: Unremarkable.    Lymph nodes: No pathologically enlarged lymph nodes in the chest or axilla.    Lungs: Streaky parenchymal lung opacities with atelectatic type changes mainly in the lung bases right greater than left..    Pleura: Small effusions right greater than left..    Visualized upper abdominal soft tissues: No significant abnormalities appreciated.    Bones: There is degenerative change of the thoracic spine.                                       X-Ray Chest 1 View (In process)                      Medications   furosemide injection 40 mg (40 mg  Intravenous Given 7/3/25 2242)   iopamidoL (ISOVUE-370) injection 100 mL (100 mLs Intravenous Given 7/4/25 0008)     Medical Decision Making  Patient with shortness breath dyspnea on exertion for the last several days that has gotten worse with sitting in his legs rule out CHF, pulmonary embolus, exacerbation of COPD, pneumonia, cardiac ischemia    Amount and/or Complexity of Data Reviewed  Labs: ordered. Decision-making details documented in ED Course.  Radiology: ordered. Decision-making details documented in ED Course.  Discussion of management or test interpretation with external provider(s): Patient laying flat in the bed no shortness breath or pain.  CTA of the chest showed no pulmonary embolus but just chronic changes.  Patient also has mild fluid overload but he is on Lasix.  All saw patient on Eliquis for his chronic atrial fib.  They want to be referred to Cardiology and pulmonology at [facility].  Will discharge to follow up.    Risk  Prescription drug management.               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

## 2025-07-08 ENCOUNTER — HOSPITAL ENCOUNTER (EMERGENCY)
Facility: HOSPITAL | Age: 85
Discharge: HOME | End: 2025-07-08
Attending: EMERGENCY MEDICINE
Payer: MEDICARE

## 2025-07-08 VITALS
TEMPERATURE: 98 F | HEART RATE: 85 BPM | RESPIRATION RATE: 16 BRPM | SYSTOLIC BLOOD PRESSURE: 135 MMHG | OXYGEN SATURATION: 100 % | DIASTOLIC BLOOD PRESSURE: 73 MMHG | WEIGHT: 190 LBS | BODY MASS INDEX: 27.2 KG/M2 | HEIGHT: 70 IN

## 2025-07-08 DIAGNOSIS — R06.02 SHORTNESS OF BREATH: Primary | ICD-10-CM

## 2025-07-08 DIAGNOSIS — R60.9 EDEMA, UNSPECIFIED TYPE: ICD-10-CM

## 2025-07-08 LAB
ALBUMIN SERPL BCP-MCNC: 2.8 G/DL (ref 3.4–4.8)
ALBUMIN/GLOB SERPL: 0.8 {RATIO}
ALP SERPL-CCNC: 113 U/L (ref 40–150)
ALT SERPL W P-5'-P-CCNC: 19 U/L
ANION GAP SERPL CALCULATED.3IONS-SCNC: 9 MMOL/L (ref 7–16)
AST SERPL W P-5'-P-CCNC: 40 U/L (ref 11–45)
BASOPHILS # BLD AUTO: 0.02 K/UL (ref 0–0.2)
BASOPHILS NFR BLD AUTO: 0.3 % (ref 0–1)
BILIRUB SERPL-MCNC: 0.8 MG/DL
BILIRUB UR QL STRIP: NEGATIVE
BUN SERPL-MCNC: 12 MG/DL (ref 8–26)
BUN/CREAT SERPL: 13 (ref 6–20)
CALCIUM SERPL-MCNC: 8.4 MG/DL (ref 8.8–10)
CHLORIDE SERPL-SCNC: 84 MMOL/L (ref 98–107)
CLARITY UR: CLEAR
CO2 SERPL-SCNC: 36 MMOL/L (ref 23–31)
COLOR UR: YELLOW
CREAT SERPL-MCNC: 0.94 MG/DL (ref 0.72–1.25)
D DIMER PPP FEU-MCNC: 3.88 ΜG/ML (ref 0–0.47)
DIFFERENTIAL METHOD BLD: ABNORMAL
EGFR (NO RACE VARIABLE) (RUSH/TITUS): 80 ML/MIN/1.73M2
EOSINOPHIL # BLD AUTO: 0.17 K/UL (ref 0–0.5)
EOSINOPHIL NFR BLD AUTO: 2.9 % (ref 1–4)
ERYTHROCYTE [DISTWIDTH] IN BLOOD BY AUTOMATED COUNT: 13.4 % (ref 11.5–14.5)
GLOBULIN SER-MCNC: 3.3 G/DL (ref 2–4)
GLUCOSE SERPL-MCNC: 95 MG/DL (ref 82–115)
GLUCOSE UR STRIP-MCNC: NORMAL MG/DL
HCT VFR BLD AUTO: 31.1 % (ref 40–54)
HGB BLD-MCNC: 10.5 G/DL (ref 13.5–18)
IMM GRANULOCYTES # BLD AUTO: 0.04 K/UL (ref 0–0.04)
IMM GRANULOCYTES NFR BLD: 0.7 % (ref 0–0.4)
KETONES UR STRIP-SCNC: NEGATIVE MG/DL
LACTATE SERPL-SCNC: 1.6 MMOL/L (ref 0.5–2.2)
LEUKOCYTE ESTERASE UR QL STRIP: NEGATIVE
LYMPHOCYTES # BLD AUTO: 1.36 K/UL (ref 1–4.8)
LYMPHOCYTES NFR BLD AUTO: 23 % (ref 27–41)
MAGNESIUM SERPL-MCNC: 2.1 MG/DL (ref 1.6–2.6)
MCH RBC QN AUTO: 34 PG (ref 27–31)
MCHC RBC AUTO-ENTMCNC: 33.8 G/DL (ref 32–36)
MCV RBC AUTO: 100.6 FL (ref 80–96)
MONOCYTES # BLD AUTO: 0.96 K/UL (ref 0–0.8)
MONOCYTES NFR BLD AUTO: 16.2 % (ref 2–6)
MPC BLD CALC-MCNC: 9.6 FL (ref 9.4–12.4)
NEUTROPHILS # BLD AUTO: 3.37 K/UL (ref 1.8–7.7)
NEUTROPHILS NFR BLD AUTO: 56.9 % (ref 53–65)
NITRITE UR QL STRIP: NEGATIVE
NRBC # BLD AUTO: 0 X10E3/UL
NRBC, AUTO (.00): 0 %
NT-PROBNP SERPL-MCNC: 2312 PG/ML (ref 1–450)
OHS QRS DURATION: 152 MS
OHS QTC CALCULATION: 463 MS
PH UR STRIP: 7 PH UNITS
PLATELET # BLD AUTO: 143 K/UL (ref 150–400)
POTASSIUM SERPL-SCNC: 3.4 MMOL/L (ref 3.5–5.1)
PROT SERPL-MCNC: 6.1 G/DL (ref 5.8–7.6)
PROT UR QL STRIP: NEGATIVE
RBC # BLD AUTO: 3.09 M/UL (ref 4.6–6.2)
RBC # UR STRIP: NEGATIVE /UL
SODIUM SERPL-SCNC: 126 MMOL/L (ref 136–145)
SP GR UR STRIP: 1.01
TROPONIN I SERPL HS-MCNC: 9.3 NG/L
UROBILINOGEN UR STRIP-ACNC: NORMAL MG/DL
WBC # BLD AUTO: 5.92 K/UL (ref 4.5–11)

## 2025-07-08 PROCEDURE — 93005 ELECTROCARDIOGRAM TRACING: CPT

## 2025-07-08 PROCEDURE — 96365 THER/PROPH/DIAG IV INF INIT: CPT

## 2025-07-08 PROCEDURE — 83605 ASSAY OF LACTIC ACID: CPT | Performed by: NURSE PRACTITIONER

## 2025-07-08 PROCEDURE — 94761 N-INVAS EAR/PLS OXIMETRY MLT: CPT

## 2025-07-08 PROCEDURE — 81003 URINALYSIS AUTO W/O SCOPE: CPT | Performed by: NURSE PRACTITIONER

## 2025-07-08 PROCEDURE — 94640 AIRWAY INHALATION TREATMENT: CPT

## 2025-07-08 PROCEDURE — 25000003 PHARM REV CODE 250: Performed by: EMERGENCY MEDICINE

## 2025-07-08 PROCEDURE — 99900035 HC TECH TIME PER 15 MIN (STAT)

## 2025-07-08 PROCEDURE — 85025 COMPLETE CBC W/AUTO DIFF WBC: CPT | Performed by: NURSE PRACTITIONER

## 2025-07-08 PROCEDURE — 85379 FIBRIN DEGRADATION QUANT: CPT | Performed by: NURSE PRACTITIONER

## 2025-07-08 PROCEDURE — 63600175 PHARM REV CODE 636 W HCPCS: Mod: JZ,TB | Performed by: NURSE PRACTITIONER

## 2025-07-08 PROCEDURE — 25000242 PHARM REV CODE 250 ALT 637 W/ HCPCS: Performed by: NURSE PRACTITIONER

## 2025-07-08 PROCEDURE — 80053 COMPREHEN METABOLIC PANEL: CPT | Performed by: NURSE PRACTITIONER

## 2025-07-08 PROCEDURE — 96375 TX/PRO/DX INJ NEW DRUG ADDON: CPT

## 2025-07-08 PROCEDURE — 36415 COLL VENOUS BLD VENIPUNCTURE: CPT | Performed by: NURSE PRACTITIONER

## 2025-07-08 PROCEDURE — 83880 ASSAY OF NATRIURETIC PEPTIDE: CPT | Performed by: NURSE PRACTITIONER

## 2025-07-08 PROCEDURE — 99285 EMERGENCY DEPT VISIT HI MDM: CPT | Mod: 25

## 2025-07-08 PROCEDURE — 63600175 PHARM REV CODE 636 W HCPCS: Performed by: EMERGENCY MEDICINE

## 2025-07-08 PROCEDURE — 84484 ASSAY OF TROPONIN QUANT: CPT | Performed by: NURSE PRACTITIONER

## 2025-07-08 PROCEDURE — 25500020 PHARM REV CODE 255: Performed by: EMERGENCY MEDICINE

## 2025-07-08 PROCEDURE — 83735 ASSAY OF MAGNESIUM: CPT | Performed by: NURSE PRACTITIONER

## 2025-07-08 RX ORDER — FUROSEMIDE 10 MG/ML
60 INJECTION INTRAMUSCULAR; INTRAVENOUS
Status: COMPLETED | OUTPATIENT
Start: 2025-07-08 | End: 2025-07-08

## 2025-07-08 RX ORDER — POTASSIUM CHLORIDE 7.45 MG/ML
10 INJECTION INTRAVENOUS
Status: COMPLETED | OUTPATIENT
Start: 2025-07-08 | End: 2025-07-08

## 2025-07-08 RX ORDER — METHYLPREDNISOLONE SOD SUCC 125 MG
125 VIAL (EA) INJECTION
Status: COMPLETED | OUTPATIENT
Start: 2025-07-08 | End: 2025-07-08

## 2025-07-08 RX ORDER — IOPAMIDOL 755 MG/ML
100 INJECTION, SOLUTION INTRAVASCULAR
Status: COMPLETED | OUTPATIENT
Start: 2025-07-08 | End: 2025-07-08

## 2025-07-08 RX ORDER — POTASSIUM CHLORIDE 20 MEQ/1
20 TABLET, EXTENDED RELEASE ORAL
Status: COMPLETED | OUTPATIENT
Start: 2025-07-08 | End: 2025-07-08

## 2025-07-08 RX ORDER — IPRATROPIUM BROMIDE AND ALBUTEROL SULFATE 2.5; .5 MG/3ML; MG/3ML
3 SOLUTION RESPIRATORY (INHALATION)
Status: COMPLETED | OUTPATIENT
Start: 2025-07-08 | End: 2025-07-08

## 2025-07-08 RX ADMIN — IOPAMIDOL 100 ML: 755 INJECTION, SOLUTION INTRAVENOUS at 03:07

## 2025-07-08 RX ADMIN — POTASSIUM CHLORIDE 10 MEQ: 7.46 INJECTION, SOLUTION INTRAVENOUS at 04:07

## 2025-07-08 RX ADMIN — METHYLPREDNISOLONE SODIUM SUCCINATE 125 MG: 125 INJECTION, POWDER, FOR SOLUTION INTRAMUSCULAR; INTRAVENOUS at 02:07

## 2025-07-08 RX ADMIN — IPRATROPIUM BROMIDE AND ALBUTEROL SULFATE 3 ML: .5; 3 SOLUTION RESPIRATORY (INHALATION) at 02:07

## 2025-07-08 RX ADMIN — FUROSEMIDE 60 MG: 10 INJECTION, SOLUTION INTRAMUSCULAR; INTRAVENOUS at 04:07

## 2025-07-08 RX ADMIN — POTASSIUM CHLORIDE EXTENDED-RELEASE 20 MEQ: 1500 TABLET ORAL at 04:07

## 2025-07-08 NOTE — ED NOTES
Bladder scan performed at this time. 231 mL of urine seen in the bladder. Pt reports he is unable to void and states he is okay to be cathed at this time.

## 2025-07-08 NOTE — ED PROVIDER NOTES
"Encounter Date: 7/8/2025       History     Chief Complaint   Patient presents with    Bloated    Leg Swelling     Patient presents to the ED with c/o bilateral leg swelling and abdominal swelling. Patient states they recently increased his lasix and his swelling is not coming down.      84 year old male presents to the emergency department to be evaluated for increased abdominal and bilateral lower leg swelling despite increasing Lasix to 40mg per day by his PCP.  He has also had dyspnea on exertion and coughing, but states coughing has improved since his wife gave him "cloves" this morning.  He is on home O2 at 3L/m and continues to have increased respiratory effort. History of chronic atrial fibrillation but states he does not have history of CHF.  Does have history of COPD is on chronic oxygen therapy at 3 L.  Has a history of chronic hypoxic respiratory failure, followed by Pulmonary in [place] and cardiology in [place].  He has a history of liver cancer, and is followed closely by providers at [facility].  Also reports a history of ventral hernia.  He was advised to come to the emergency department for possible admission.  Denies any known fever, chest pain, nausea, vomiting, or diarrhea. He does have abdominal "bloating" and feeling tightness of skin in the abdomen and lower legs and feet.  He has noticed redness on his lower abdomen that extends around his flanks to his lower back.      The history is provided by the patient and the spouse.     Review of patient's allergies indicates:   Allergen Reactions    Adhesive      Tears skin     Past Medical History:   Diagnosis Date    Anticoagulant long-term use     COPD (chronic obstructive pulmonary disease)     Coronary artery disease     Other pulmonary embolism without acute cor pulmonale     Paroxysmal atrial fibrillation      Past Surgical History:   Procedure Laterality Date    CARDIAC SURGERY      CHOLECYSTECTOMY      EYE SURGERY      JOINT REPLACEMENT "       No family history on file.  Social History[1]  Review of Systems   Constitutional:  Positive for fatigue. Negative for fever.   HENT:  Negative for congestion, rhinorrhea and sore throat.    Respiratory:  Positive for cough, chest tightness, shortness of breath and wheezing.    Cardiovascular:  Positive for leg swelling. Negative for chest pain and palpitations.   Gastrointestinal:  Positive for abdominal distention and abdominal pain. Negative for constipation, diarrhea, nausea and vomiting.   Genitourinary:  Positive for decreased urine volume and dysuria. Negative for flank pain and hematuria.   Musculoskeletal:  Positive for arthralgias, gait problem (hip pain; history of hip replacement) and myalgias.   Skin:  Positive for color change (abdomen, bilateral flank, back).   Psychiatric/Behavioral: Negative.         Physical Exam     Initial Vitals [07/08/25 1339]   BP Pulse Resp Temp SpO2   (!) 121/59 65 20 97.9 °F (36.6 °C) 100 %      MAP       --         Physical Exam    Vitals reviewed.  Constitutional: He appears well-developed and well-nourished. He is not diaphoretic. He has a sickly appearance. He appears ill. He appears distressed.   HENT:   Head: Normocephalic and atraumatic.   Right Ear: External ear normal.   Left Ear: External ear normal.   Nose: Nose normal. Mouth/Throat: Oropharynx is clear and moist. No oropharyngeal exudate.   Eyes: EOM are normal. Pupils are equal, round, and reactive to light. No scleral icterus.   Neck: Neck supple.   Normal range of motion.  Pulmonary/Chest: Accessory muscle usage present. No apnea. He is in respiratory distress. He has wheezes (expiratory) in the right upper field and the left upper field. He has rales in the right middle field, the right lower field, the left middle field and the left lower field.   Abdominal: Abdomen is protuberant. He exhibits distension. He exhibits no pulsatile midline mass.   Musculoskeletal:      Cervical back: Normal range of  motion and neck supple.      Right lower le+ Pitting Edema present.      Left lower le+ Pitting Edema present.      Right ankle: Swelling present.      Left ankle: Swelling present.      Right foot: Swelling present.      Left foot: Swelling present.     Skin: There is erythema.              Medical Screening Exam   See Full Note    ED Course   Procedures  Labs Reviewed   COMPREHENSIVE METABOLIC PANEL - Abnormal       Result Value    Sodium 126 (*)     Potassium 3.4 (*)     Chloride 84 (*)     CO2 36 (*)     Anion Gap 9      Glucose 95      BUN 12      Creatinine 0.94      BUN/Creatinine Ratio 13      Calcium 8.4 (*)     Total Protein 6.1      Albumin 2.8 (*)     Globulin 3.3      A/G Ratio 0.8      Bilirubin, Total 0.8      Alk Phos 113      ALT 19      AST 40      eGFR 80     CBC WITH DIFFERENTIAL - Abnormal    WBC 5.92      RBC 3.09 (*)     Hemoglobin 10.5 (*)     Hematocrit 31.1 (*)     .6 (*)     MCH 34.0 (*)     MCHC 33.8      RDW 13.4      Platelet Count 143 (*)     MPV 9.6      Neutrophils % 56.9      Lymphocytes % 23.0 (*)     Monocytes % 16.2 (*)     Eosinophils % 2.9      Basophils % 0.3      Immature Granulocytes % 0.7 (*)     nRBC, Auto 0.0      Neutrophils, Abs 3.37      Lymphocytes, Absolute 1.36      Monocytes, Absolute 0.96 (*)     Eosinophils, Absolute 0.17      Basophils, Absolute 0.02      Immature Granulocytes, Absolute 0.04      nRBC, Absolute 0.00      Diff Type Auto     NT-PRO NATRIURETIC PEPTIDE - Abnormal    ProBNP 2,312 (*)    D DIMER, QUANTITATIVE - Abnormal    D-Dimer 3.88 (*)    MAGNESIUM - Normal    Magnesium 2.1     LACTIC ACID, PLASMA - Normal    Lactic Acid 1.6     TROPONIN I - Normal    Troponin I High Sensitivity 9.3     CBC W/ AUTO DIFFERENTIAL    Narrative:     The following orders were created for panel order CBC auto differential.  Procedure                               Abnormality         Status                     ---------                                -----------         ------                     CBC with Differential[[id number]]       Abnormal            Final result                 Please view results for these tests on the individual orders.   URINALYSIS, REFLEX TO URINE CULTURE    Color, UA Yellow      Clarity, UA Clear      pH, UA 7.0      Leukocytes, UA Negative      Nitrites, UA Negative      Protein, UA Negative      Glucose, UA Normal      Ketones, UA Negative      Urobilinogen, UA Normal      Bilirubin, UA Negative      Blood, UA Negative      Specific Gravity, UA 1.015          ECG Results              EKG 12-lead (Final result)        Collection Time Result Time QRS Duration OHS QTC Calculation    07/08/25 13:49:02 07/08/25 20:53:30 152 463                     Final result by Interface, Lab In [facility] (07/08/25 20:53:34)                   Narrative:    Test Reason : R06.02,    Vent. Rate :  66 BPM     Atrial Rate :    BPM     P-R Int :   0 ms          QRS Dur : 152 ms      QT Int : 452 ms       P-R-T Axes :   0 174   2 degrees    QTcB Int : 463 ms    Possible atrial flutter with PVC(s) or aberrant ventricular conduction  Marked right axis deviation  Right bundle branch block  Inferior T wave abnormality is nonspecific  Abnormal ECG    Confirmed by Lina Chavez ([id number]) on 7/8/2025 8:53:27 PM    Referred By: AAAREFERRAL SELF           Confirmed By: Rehmat Kathy                                  Imaging Results              CTA Chest Non-Coronary (PE Studies) (Final result)  Result time 07/08/25 17:06:12      Final result by Mateo Acosta MD (07/08/25 17:06:12)                   Impression:      No pulmonary embolism, noting that distal subsegmental arteries are distorted by motion artifact.    Small bilateral pleural effusions, with compressive atelectasis in the adjacent lower lobes.    Patchy ground-glass opacities elsewhere in the lungs, slightly progressed from 07/03/2025, which could represent atelectasis, pneumonitis, or infection.    Mediastinal  lymphadenopathy, likely reactive.    Cirrhosis with signs of portal hypertension including splenomegaly and ascites.    Other findings as described.      Electronically signed by: Mateo Acosta  Date:    07/08/2025  Time:    17:06               Narrative:    EXAMINATION:  CTA CHEST NON CORONARY (PE STUDIES)    CLINICAL HISTORY:  Pulmonary embolism (PE) suspected, positive D-dimer;    TECHNIQUE:  Low dose axial images, sagittal and coronal reformations were obtained from the thoracic inlet to the lung bases following the IV administration of 100 mL of Omnipaque 350.  Contrast timing was optimized to evaluate the pulmonary arteries.  MIP images were performed.    COMPARISON:  CTA chest 07/03/2025    FINDINGS:  LINES/TUBES:  None.    SOFT TISSUES: No axillary or subpectoral lymphadenopathy. The visualized thyroid gland is unremarkable.    HEART AND MEDIASTINUM: Enlarged mediastinal lymph nodes measuring up to 1.4 cm.  Mild cardiomegaly.  No pericardial fluid.  Dense mitral annular calcifications.  There are calcifications of aortic valve, coronary arteries, thoracic aorta, and arch vessels as well.  Postoperative changes from CABG.  The main pulmonary artery is enlarged measuring 3.8 cm suggesting pulmonary hypertension.  No filling defect in the pulmonary arteries to the proximal subsegmental level.  Distal subsegmental arteries are distorted by motion artifact.    PLEURA: Small bilateral pleural effusions.  No pneumothorax.    LUNGS AND AIRWAYS: Posterior bowing of the trachea compatible with expiratory phase imaging.  Central airways are patent.  Lungs are degraded by motion artifact.  Compressive atelectasis in the lower lobes bilaterally adjacent to the pleural effusions.  There are patchy ground-glass opacities elsewhere in the lungs, for example in the lingula on 2:198, which have progressed slightly from the previous exam.    UPPER ABDOMEN: Nodular liver contour.  Cholecystectomy.  Splenomegaly measuring 15.7 cm.   Small volume ascites.    BONES: Degenerative changes.  Postoperative changes from median sternotomy.  No acute fracture or focal osseous lesion.                                       X-Ray Chest AP Portable (Final result)  Result time 07/08/25 14:36:56      Final result by Mateo Acosta MD (07/08/25 14:36:56)                   Impression:      As above.      Electronically signed by: Mateo Acosta  Date:    07/08/2025  Time:    14:36               Narrative:    EXAMINATION:  XR CHEST AP PORTABLE    CLINICAL HISTORY:  Shortness of breath    TECHNIQUE:  Single frontal view of the chest was performed.    COMPARISON:  Chest radiograph 07/03/2025    FINDINGS:  Lines and tubes: None.    Heart and mediastinum: Magnified by AP technique.  Postoperative changes from CABG.    Pleura: Small right pleural effusion.  No pneumothorax.    Lungs: Mild volume loss in the right lung.  There is pulmonary vascular indistinctness and perihilar fullness suggesting pulmonary edema.  There are patchy opacities in the lung bases bilaterally, slightly more pronounced on the left compared to 07/03/2025.    Soft tissue/bone: Median sternotomy wires.                                       Medications   albuterol-ipratropium 2.5 mg-0.5 mg/3 mL nebulizer solution 3 mL (3 mLs Nebulization Given 7/8/25 1433)   methylPREDNISolone sodium succinate injection 125 mg (125 mg Intravenous Given 7/8/25 1434)   furosemide injection 60 mg (60 mg Intravenous Given 7/8/25 1616)   potassium chloride 10 mEq in 100 mL IVPB (0 mEq Intravenous Stopped 7/8/25 1729)   potassium chloride SA CR tablet 20 mEq (20 mEq Oral Given 7/8/25 1617)   iopamidoL (ISOVUE-370) injection 100 mL (100 mLs Intravenous Given 7/8/25 1555)     Medical Decision Making  84 year old male presents to the emergency department to be evaluated for increased abdominal and bilateral lower leg swelling despite increasing Lasix to 40mg per day by his PCP.  He has also had dyspnea on exertion and  "coughing, but states coughing has improved since his wife gave him "cloves" this morning.  He is on home O2 at 3L/m and continues to have increased respiratory effort. History of chronic atrial fibrillation but states he does not have history of CHF.  Does have history of COPD is on chronic oxygen therapy at 3 L.  Has a history of chronic hypoxic respiratory failure, followed by Pulmonary in [place] and cardiology in [place].  He has a history of liver cancer, and is followed closely by providers at [facility].  Also reports a history of ventral hernia.  He was advised to come to the emergency department for possible admission.  Denies any known fever, chest pain, nausea, vomiting, or diarrhea. He does have abdominal "bloating" and feeling tightness of skin in the abdomen and lower legs and feet.  He has noticed redness on his lower abdomen that extends around his flanks to his lower back.      DDX: HYPERVOLEMIA VS PE VS COPD EXACERBATION VS ASCITES +/- OTHER  OUTPATIENT FOLLOW UP    Labs ordered and reviewed  EKG ordered and reviewed by Dr. Flannery.  Chest Xray ordered and reviewed by Dr. Flannery. Radiologist interpretation significant for Small right pleural effusion.  No pneumothorax. Lungs: Mild volume loss in the right lung.  There is pulmonary vascular indistinctness and perihilar fullness suggesting pulmonary edema.  There are patchy opacities in the lung bases bilaterally, slightly more pronounced on the left compared to 07/03/2025.    IV solumedrol and DuoNeb ordered.  Case discussed with Dr. Flannery. Care transferred to Dr. Flannery.     Amount and/or Complexity of Data Reviewed  Labs: ordered.  Radiology: ordered.    Risk  OTC drugs.  Prescription drug management.                                      Clinical Impression:   Final diagnoses:  [R06.02] Shortness of breath (Primary)  [R60.9] Edema, unspecified type        ED Disposition Condition    Discharge Stable          ED Prescriptions    None   "     Follow-up Information    None              [1]   Social History  Tobacco Use    Smoking status: Never    Smokeless tobacco: Never   Vaping Use    Vaping status: Never Used   Substance Use Topics    Alcohol use: Never    Drug use: Never        Chandan Flannery MD  07/31/25 [id number]

## 2025-07-09 ENCOUNTER — TELEPHONE (OUTPATIENT)
Dept: EMERGENCY MEDICINE | Facility: HOSPITAL | Age: 85
End: 2025-07-09
Payer: MEDICARE

## 2025-07-10 LAB
OHS QRS DURATION: 138 MS
OHS QTC CALCULATION: 509 MS

## 2025-07-15 ENCOUNTER — TELEPHONE (OUTPATIENT)
Dept: PULMONOLOGY | Facility: CLINIC | Age: 85
End: 2025-07-15
Payer: MEDICARE

## 2025-07-23 ENCOUNTER — TELEPHONE (OUTPATIENT)
Dept: GASTROENTEROLOGY | Facility: CLINIC | Age: 85
End: 2025-07-23
Payer: MEDICARE

## 2025-07-23 NOTE — TELEPHONE ENCOUNTER
Call to pt - spoke w/ spouse stated she was told to f/u w/ GI as needed - stated pt was having dark stool - stated that she missed call from [facility] and has been trying to call back - advised I would consult w/ provider and either me or herself will return call to her regarding any recommendation - good vu noted

## 2025-07-24 ENCOUNTER — TELEPHONE (OUTPATIENT)
Dept: PULMONOLOGY | Facility: CLINIC | Age: 85
End: 2025-07-24
Payer: MEDICARE

## 2025-07-24 NOTE — TELEPHONE ENCOUNTER
Copied from CRM #[id number]. Topic: Appointments - Appointment Scheduling  >> Jul 24, 2025  1:48 PM Carlos wrote:  Who Called: Cayla David     Caller is requesting a sooner appointment. Caller declined first available appointment listed below. Caller will not accept being placed on the waitlist and is requesting a message be sent to doctor.    When is the first available appointment?sep 3 I scheduled pt   Options offered (Virtual Visit, Urgent Care):   Symptoms: [facility] was in for  shortness of breath and pneumonia       Preferred Method of Contact: Phone Call  Patient's Preferred Phone Number on File: [phone]   Best Call Back Number, if different:  Additional Information: Cayla David is wife name

## 2025-07-24 NOTE — TELEPHONE ENCOUNTER
Spoke with pt home health nurse in attempt to move to earlier appt time from 9/3 to something sooner. Voiced patient is experiencing COPD exacerbations/ recent Dx of pneumonia causing SOB and low 02 sats, he currently is on 3L of oxygen at home and using albuterol inhaler. Nurse also stated he has a recent Dx of a liver mass where he will be undergoing a biopsy at [facility]. Did voice she educated about titration of oxygen liter when sats are low. I stated I will forward to provider and follow up.

## 2025-07-29 ENCOUNTER — TELEPHONE (OUTPATIENT)
Dept: PULMONOLOGY | Facility: CLINIC | Age: 85
End: 2025-07-29
Payer: MEDICARE

## 2025-07-29 NOTE — TELEPHONE ENCOUNTER
Spoke with Priscilla, voiced to her that Dr. Coe is out of clinic until Wednesday, can forward message to her regarding SOB, stated it is more severe and having to use rescue inhaler more than normal. Once instructed I will follow up!

## 2025-07-29 NOTE — TELEPHONE ENCOUNTER
Copied from CRM #[id number]. Topic: General Inquiry - Return Call  >> Jul 29, 2025 10:28 AM Maria Elena wrote:  Priscilla with [facility] returned a call from Julisa in regards to a sooner appt for pt Larry David. Please give her a call back. Priscilla says that if she does not answer then she is probably with a pt but please leave a message in regards to his appt. Thank you.

## 2025-07-29 NOTE — TELEPHONE ENCOUNTER
Copied from CRM #[id number]. Topic: Appointments - Appointment Rescheduling  >> Jul 29, 2025  9:41 AM Claire wrote:  Who Called: Priscilla with [facility]     Caller is requesting assistance/information from provider's office.    Following up on Mr. David getting a sooner appointment due to having breathing troubles     Preferred Method of Contact: Phone Call    Best Call Back Number, if different:[phone]  Additional Information:

## 2025-08-04 ENCOUNTER — TELEPHONE (OUTPATIENT)
Dept: GASTROENTEROLOGY | Facility: CLINIC | Age: 85
End: 2025-08-04
Payer: MEDICARE

## 2025-08-04 NOTE — TELEPHONE ENCOUNTER
Call returned to pt - aware of appt at [facility] for 8/20/25 at 10am - thanked nurse for calling

## 2025-08-08 ENCOUNTER — TELEPHONE (OUTPATIENT)
Dept: GASTROENTEROLOGY | Facility: CLINIC | Age: 85
End: 2025-08-08
Payer: MEDICARE

## 2025-08-08 NOTE — TELEPHONE ENCOUNTER
Call to pt - spoke w/ spouse - stated she wanted to know why her  was scheduled for a liver biopsy when he is scheduled for [place] - advised that was not ordered by our provider but Inder Dooley she would have to f/u with that office regarding the scheduling

## 2025-08-11 PROBLEM — R10.11 RIGHT UPPER QUADRANT ABDOMINAL PAIN: Status: RESOLVED | Noted: 2025-06-24 | Resolved: 2025-08-11

## 2025-09-03 ENCOUNTER — OFFICE VISIT (OUTPATIENT)
Dept: PULMONOLOGY | Facility: CLINIC | Age: 85
End: 2025-09-03
Payer: MEDICARE

## 2025-09-03 VITALS
WEIGHT: 176 LBS | HEART RATE: 83 BPM | SYSTOLIC BLOOD PRESSURE: 110 MMHG | DIASTOLIC BLOOD PRESSURE: 62 MMHG | RESPIRATION RATE: 18 BRPM | OXYGEN SATURATION: 93 % | HEIGHT: 70 IN | BODY MASS INDEX: 25.2 KG/M2

## 2025-09-03 DIAGNOSIS — J96.11 CHRONIC RESPIRATORY FAILURE WITH HYPOXIA: ICD-10-CM

## 2025-09-03 DIAGNOSIS — J45.20 MILD INTERMITTENT REACTIVE AIRWAY DISEASE WITHOUT COMPLICATION: ICD-10-CM

## 2025-09-03 DIAGNOSIS — I50.9 ACUTE ON CHRONIC HEART FAILURE, UNSPECIFIED HEART FAILURE TYPE: Primary | ICD-10-CM

## 2025-09-03 PROBLEM — J45.909 REACTIVE AIRWAY DISEASE WITHOUT COMPLICATION: Status: ACTIVE | Noted: 2025-09-03

## 2025-09-03 PROCEDURE — 99214 OFFICE O/P EST MOD 30 MIN: CPT | Mod: PBBFAC | Performed by: STUDENT IN AN ORGANIZED HEALTH CARE EDUCATION/TRAINING PROGRAM

## 2025-09-03 PROCEDURE — 99999 PR PBB SHADOW E&M-EST. PATIENT-LVL IV: CPT | Mod: PBBFAC,,, | Performed by: STUDENT IN AN ORGANIZED HEALTH CARE EDUCATION/TRAINING PROGRAM

## 2025-09-03 PROCEDURE — 99204 OFFICE O/P NEW MOD 45 MIN: CPT | Mod: S$PBB,,, | Performed by: STUDENT IN AN ORGANIZED HEALTH CARE EDUCATION/TRAINING PROGRAM

## 2025-09-03 RX ORDER — SPIRONOLACTONE 50 MG/1
50 TABLET, FILM COATED ORAL
COMMUNITY
Start: 2025-07-20

## 2025-09-03 RX ORDER — PANTOPRAZOLE SODIUM 40 MG/1
40 TABLET, DELAYED RELEASE ORAL
COMMUNITY
End: 2025-09-03

## 2025-09-03 RX ORDER — POTASSIUM CHLORIDE 750 MG/1
10 CAPSULE, EXTENDED RELEASE ORAL
COMMUNITY
Start: 2025-07-20

## 2025-09-03 RX ORDER — CEFPODOXIME PROXETIL 200 MG/1
200 TABLET, FILM COATED ORAL 2 TIMES DAILY WITH MEALS
COMMUNITY
Start: 2025-07-20 | End: 2025-09-03 | Stop reason: ALTCHOICE

## 2025-09-03 RX ORDER — ALBUTEROL SULFATE 90 UG/1
INHALANT RESPIRATORY (INHALATION)
COMMUNITY
Start: 2025-07-20